# Patient Record
Sex: MALE | Race: WHITE | NOT HISPANIC OR LATINO
[De-identification: names, ages, dates, MRNs, and addresses within clinical notes are randomized per-mention and may not be internally consistent; named-entity substitution may affect disease eponyms.]

---

## 2017-10-31 ENCOUNTER — LABORATORY RESULT (OUTPATIENT)
Age: 62
End: 2017-10-31

## 2017-11-02 ENCOUNTER — LABORATORY RESULT (OUTPATIENT)
Age: 62
End: 2017-11-02

## 2017-11-02 ENCOUNTER — APPOINTMENT (OUTPATIENT)
Dept: INTERNAL MEDICINE | Facility: CLINIC | Age: 62
End: 2017-11-02
Payer: COMMERCIAL

## 2017-11-02 PROCEDURE — 36415 COLL VENOUS BLD VENIPUNCTURE: CPT

## 2017-11-03 LAB
ALBUMIN SERPL ELPH-MCNC: 3.9 G/DL
ALP BLD-CCNC: 40 U/L
ALT SERPL-CCNC: 20 U/L
ANION GAP SERPL CALC-SCNC: 14 MMOL/L
APPEARANCE: CLEAR
AST SERPL-CCNC: 23 U/L
BASOPHILS # BLD AUTO: 0.03 K/UL
BASOPHILS NFR BLD AUTO: 0.6 %
BILIRUB SERPL-MCNC: 0.4 MG/DL
BILIRUBIN URINE: NEGATIVE
BLOOD URINE: ABNORMAL
BUN SERPL-MCNC: 19 MG/DL
CALCIUM SERPL-MCNC: 8.9 MG/DL
CHLORIDE SERPL-SCNC: 100 MMOL/L
CHOLEST SERPL-MCNC: 193 MG/DL
CHOLEST/HDLC SERPL: 3.4 RATIO
CO2 SERPL-SCNC: 26 MMOL/L
COLOR: YELLOW
CREAT SERPL-MCNC: 0.97 MG/DL
EOSINOPHIL # BLD AUTO: 0.28 K/UL
EOSINOPHIL NFR BLD AUTO: 5.3 %
ERYTHROCYTE [SEDIMENTATION RATE] IN BLOOD BY WESTERGREN METHOD: 2 MM/HR
GLUCOSE QUALITATIVE U: NEGATIVE MG/DL
GLUCOSE SERPL-MCNC: 82 MG/DL
HCT VFR BLD CALC: 46.9 %
HDLC SERPL-MCNC: 57 MG/DL
HGB BLD-MCNC: 15.2 G/DL
IMM GRANULOCYTES NFR BLD AUTO: 0 %
KETONES URINE: NEGATIVE
LDLC SERPL CALC-MCNC: 123 MG/DL
LDLC SERPL DIRECT ASSAY-MCNC: 132 MG/DL
LEUKOCYTE ESTERASE URINE: NEGATIVE
LYMPHOCYTES # BLD AUTO: 2.1 K/UL
LYMPHOCYTES NFR BLD AUTO: 39.7 %
MAN DIFF?: NORMAL
MCHC RBC-ENTMCNC: 30.3 PG
MCHC RBC-ENTMCNC: 32.4 GM/DL
MCV RBC AUTO: 93.6 FL
MONOCYTES # BLD AUTO: 0.46 K/UL
MONOCYTES NFR BLD AUTO: 8.7 %
NEUTROPHILS # BLD AUTO: 2.42 K/UL
NEUTROPHILS NFR BLD AUTO: 45.7 %
NITRITE URINE: NEGATIVE
PH URINE: 5.5
PLATELET # BLD AUTO: 244 K/UL
POTASSIUM SERPL-SCNC: 4.4 MMOL/L
PROT SERPL-MCNC: 7.3 G/DL
PROTEIN URINE: NEGATIVE MG/DL
RBC # BLD: 5.01 M/UL
RBC # FLD: 13.2 %
SAVE SPECIMEN: NORMAL
SODIUM SERPL-SCNC: 140 MMOL/L
SPECIFIC GRAVITY URINE: 1.02
T3 SERPL-MCNC: 121 NG/DL
T3RU NFR SERPL: 0.95 INDEX
T4 FREE SERPL-MCNC: 1.3 NG/DL
TRIGL SERPL-MCNC: 66 MG/DL
TSH SERPL-ACNC: 2.6 UIU/ML
UROBILINOGEN URINE: NEGATIVE MG/DL
WBC # FLD AUTO: 5.29 K/UL

## 2017-11-10 ENCOUNTER — NON-APPOINTMENT (OUTPATIENT)
Age: 62
End: 2017-11-10

## 2017-11-10 ENCOUNTER — APPOINTMENT (OUTPATIENT)
Dept: INTERNAL MEDICINE | Facility: CLINIC | Age: 62
End: 2017-11-10
Payer: COMMERCIAL

## 2017-11-10 VITALS
HEIGHT: 71 IN | TEMPERATURE: 97.6 F | RESPIRATION RATE: 16 BRPM | WEIGHT: 160 LBS | HEART RATE: 84 BPM | DIASTOLIC BLOOD PRESSURE: 66 MMHG | SYSTOLIC BLOOD PRESSURE: 118 MMHG | OXYGEN SATURATION: 97 % | BODY MASS INDEX: 22.4 KG/M2

## 2017-11-10 DIAGNOSIS — M54.12 RADICULOPATHY, CERVICAL REGION: ICD-10-CM

## 2017-11-10 PROCEDURE — 93000 ELECTROCARDIOGRAM COMPLETE: CPT

## 2017-11-10 PROCEDURE — 90670 PCV13 VACCINE IM: CPT

## 2017-11-10 PROCEDURE — 99396 PREV VISIT EST AGE 40-64: CPT | Mod: 25

## 2017-11-10 PROCEDURE — 94010 BREATHING CAPACITY TEST: CPT

## 2017-11-10 PROCEDURE — 90472 IMMUNIZATION ADMIN EACH ADD: CPT

## 2017-11-10 PROCEDURE — 90688 IIV4 VACCINE SPLT 0.5 ML IM: CPT

## 2017-11-10 PROCEDURE — G0008: CPT

## 2017-11-10 PROCEDURE — G0009: CPT

## 2019-01-23 ENCOUNTER — APPOINTMENT (OUTPATIENT)
Dept: INTERNAL MEDICINE | Facility: CLINIC | Age: 64
End: 2019-01-23

## 2019-01-25 ENCOUNTER — APPOINTMENT (OUTPATIENT)
Dept: INTERNAL MEDICINE | Facility: CLINIC | Age: 64
End: 2019-01-25
Payer: MEDICARE

## 2019-01-25 ENCOUNTER — LABORATORY RESULT (OUTPATIENT)
Age: 64
End: 2019-01-25

## 2019-01-25 VITALS
WEIGHT: 164 LBS | BODY MASS INDEX: 22.96 KG/M2 | OXYGEN SATURATION: 98 % | SYSTOLIC BLOOD PRESSURE: 120 MMHG | DIASTOLIC BLOOD PRESSURE: 70 MMHG | RESPIRATION RATE: 16 BRPM | TEMPERATURE: 96.7 F | HEART RATE: 74 BPM | HEIGHT: 71 IN

## 2019-01-25 DIAGNOSIS — K63.5 POLYP OF COLON: ICD-10-CM

## 2019-01-25 DIAGNOSIS — R05 COUGH: ICD-10-CM

## 2019-01-25 DIAGNOSIS — H54.7 UNSPECIFIED VISUAL LOSS: ICD-10-CM

## 2019-01-25 DIAGNOSIS — G25.2 OTHER SPECIFIED FORMS OF TREMOR: ICD-10-CM

## 2019-01-25 PROCEDURE — G0009: CPT

## 2019-01-25 PROCEDURE — 99215 OFFICE O/P EST HI 40 MIN: CPT | Mod: 25

## 2019-01-25 PROCEDURE — 36415 COLL VENOUS BLD VENIPUNCTURE: CPT

## 2019-01-25 PROCEDURE — 90674 CCIIV4 VAC NO PRSV 0.5 ML IM: CPT

## 2019-01-25 PROCEDURE — 93000 ELECTROCARDIOGRAM COMPLETE: CPT

## 2019-01-25 PROCEDURE — 90732 PPSV23 VACC 2 YRS+ SUBQ/IM: CPT

## 2019-01-25 PROCEDURE — 94010 BREATHING CAPACITY TEST: CPT

## 2019-01-25 PROCEDURE — G0008: CPT

## 2019-01-25 NOTE — HEALTH RISK ASSESSMENT
[Very Good] : ~his/her~  mood as very good [No falls in past year] : Patient reported no falls in the past year [0] : 2) Feeling down, depressed, or hopeless: Not at all (0) [FreeTextEntry1] : Vision and postherpetic neuralgia, Also tremor of the left hand [] : No [de-identified] : None [[id number]] : 0

## 2019-01-25 NOTE — ASSESSMENT
[FreeTextEntry1] : Neurology consult for management of the intention tremor of the left upper extremity. 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 He also has persisting symptomatology relating to the postherpetic neuralgia of the right forehead.\par \par Ophthalmology consultation for evaluation of diminishing vision.                                                                                    His left inguinal hernia is asymptomatic. He is warned about the risk of incarceration or strangulation and that it is an emergency problem .\par \par Influenza immunization is administered to the left arm and Pneumovax-23 is administered to the right arm.  He is urged to obtain shingrix at the local pharmacy.

## 2019-01-25 NOTE — HISTORY OF PRESENT ILLNESS
[FreeTextEntry1] : Complete annual examination [de-identified] : Feels well.. Still with discomfort from right frontoparietal Zoster..  He has also had diminishing vis in the past He has seen to optometrist in the past year with no improvement in his vision.

## 2019-02-15 LAB
ALBUMIN SERPL ELPH-MCNC: 3.9 G/DL
ALP BLD-CCNC: 53 U/L
ALT SERPL-CCNC: 38 U/L
ANION GAP SERPL CALC-SCNC: 10 MMOL/L
APPEARANCE: CLEAR
AST SERPL-CCNC: 23 U/L
BACTERIA: NEGATIVE
BASOPHILS # BLD AUTO: 0.02 K/UL
BASOPHILS NFR BLD AUTO: 0.3 %
BILIRUB SERPL-MCNC: 0.7 MG/DL
BILIRUBIN URINE: NEGATIVE
BLOOD URINE: ABNORMAL
BUN SERPL-MCNC: 15 MG/DL
CALCIUM SERPL-MCNC: 9.1 MG/DL
CHLORIDE SERPL-SCNC: 100 MMOL/L
CHOLEST SERPL-MCNC: 184 MG/DL
CHOLEST/HDLC SERPL: 3.5 RATIO
CO2 SERPL-SCNC: 28 MMOL/L
COLOR: YELLOW
CREAT SERPL-MCNC: 1.23 MG/DL
EOSINOPHIL # BLD AUTO: 0.17 K/UL
EOSINOPHIL NFR BLD AUTO: 2.8 %
GLUCOSE QUALITATIVE U: NEGATIVE MG/DL
GLUCOSE SERPL-MCNC: 92 MG/DL
HCT VFR BLD CALC: 46.9 %
HDLC SERPL-MCNC: 53 MG/DL
HGB BLD-MCNC: 14.9 G/DL
HYALINE CASTS: 0 /LPF
IMM GRANULOCYTES NFR BLD AUTO: 0.3 %
KETONES URINE: NEGATIVE
LDLC SERPL CALC-MCNC: 114 MG/DL
LEUKOCYTE ESTERASE URINE: NEGATIVE
LYMPHOCYTES # BLD AUTO: 1.53 K/UL
LYMPHOCYTES NFR BLD AUTO: 25.2 %
MAN DIFF?: NORMAL
MCHC RBC-ENTMCNC: 30 PG
MCHC RBC-ENTMCNC: 31.8 GM/DL
MCV RBC AUTO: 94.4 FL
MICROSCOPIC-UA: NORMAL
MONOCYTES # BLD AUTO: 0.7 K/UL
MONOCYTES NFR BLD AUTO: 11.5 %
NEUTROPHILS # BLD AUTO: 3.64 K/UL
NEUTROPHILS NFR BLD AUTO: 59.9 %
NITRITE URINE: NEGATIVE
PH URINE: 7
PLATELET # BLD AUTO: 196 K/UL
POTASSIUM SERPL-SCNC: 4.2 MMOL/L
PROT SERPL-MCNC: 6.6 G/DL
PROTEIN URINE: NEGATIVE MG/DL
PSA FREE FLD-MCNC: 23 %
PSA FREE SERPL-MCNC: 0.68 NG/ML
PSA SERPL-MCNC: 2.92 NG/ML
RBC # BLD: 4.97 M/UL
RBC # FLD: 14 %
RED BLOOD CELLS URINE: 3 /HPF
SODIUM SERPL-SCNC: 138 MMOL/L
SPECIFIC GRAVITY URINE: 1.02
SQUAMOUS EPITHELIAL CELLS: 0 /HPF
T3 SERPL-MCNC: 130 NG/DL
T4 SERPL-MCNC: 7.2 UG/DL
TRIGL SERPL-MCNC: 85 MG/DL
TSH SERPL-ACNC: 2.87 UIU/ML
UROBILINOGEN URINE: NEGATIVE MG/DL
WBC # FLD AUTO: 6.08 K/UL
WHITE BLOOD CELLS URINE: 0 /HPF

## 2019-03-21 ENCOUNTER — APPOINTMENT (OUTPATIENT)
Dept: NEUROLOGY | Facility: CLINIC | Age: 64
End: 2019-03-21

## 2019-03-21 VITALS
BODY MASS INDEX: 23.38 KG/M2 | WEIGHT: 167 LBS | DIASTOLIC BLOOD PRESSURE: 75 MMHG | SYSTOLIC BLOOD PRESSURE: 140 MMHG | HEIGHT: 71 IN | HEART RATE: 82 BPM

## 2019-03-28 ENCOUNTER — APPOINTMENT (OUTPATIENT)
Dept: NEUROLOGY | Facility: CLINIC | Age: 64
End: 2019-03-28
Payer: MEDICARE

## 2019-03-28 VITALS
DIASTOLIC BLOOD PRESSURE: 81 MMHG | HEART RATE: 83 BPM | WEIGHT: 165 LBS | SYSTOLIC BLOOD PRESSURE: 133 MMHG | HEIGHT: 71 IN | BODY MASS INDEX: 23.1 KG/M2

## 2019-03-28 DIAGNOSIS — B02.29 OTHER POSTHERPETIC NERVOUS SYSTEM INVOLVEMENT: ICD-10-CM

## 2019-03-28 PROCEDURE — 99205 OFFICE O/P NEW HI 60 MIN: CPT

## 2019-03-28 NOTE — PHYSICAL EXAM
[General Appearance - Alert] : alert [General Appearance - Well Developed] : well developed [Oriented To Time, Place, And Person] : oriented to person, place, and time [Person] : oriented to person [Place] : oriented to place [Time] : oriented to time [Short Term Intact] : short term memory intact [Span Intact] : the attention span was normal [Naming Objects] : no difficulty naming common objects [Fluency] : fluency intact [Current Events] : adequate knowledge of current events [Cranial Nerves Optic (II)] : visual acuity intact bilaterally,  visual fields full to confrontation, pupils equal round and reactive to light [Cranial Nerves Oculomotor (III)] : extraocular motion intact [Cranial Nerves Trigeminal (V)] : facial sensation intact symmetrically [Cranial Nerves Facial (VII)] : face symmetrical [Cranial Nerves Vestibulocochlear (VIII)] : hearing was intact bilaterally [Cranial Nerves Glossopharyngeal (IX)] : tongue and palate midline [Cranial Nerves Accessory (XI - Cranial And Spinal)] : head turning and shoulder shrug symmetric [Cranial Nerves Hypoglossal (XII)] : there was no tongue deviation with protrusion [Motor Tone] : muscle tone was normal in all four extremities [Motor Strength] : muscle strength was normal in all four extremities [Sensation Tactile Decrease] : light touch was intact [Sensation Pain / Temperature Decrease] : pain and temperature was intact [Coordination - Dysmetria Impaired Finger-to-Nose Bilateral] : not present [1+] : Patella left 1+ [FreeTextEntry5] : left hand postural tremor and action tremors. [Extraocular Movements] : extraocular movements were intact [Hearing Threshold Finger Rub Not [place]] : hearing was normal [Full Pulse] : the pedal pulses are present [Abnormal Walk] : normal gait [] : no rash

## 2019-03-28 NOTE — DISCUSSION/SUMMARY
[FreeTextEntry1] : 63 M who is here for initial consultation of essential tremor that is likely related to his occupation. Will try propranolol 20mg for his symptoms prn.\par \par For his neuralgia, will try lidocaine cream as patient prefers that over medication. \par \par Patient was advised to continue to monitor for neurologic symptoms and to notify my office or go to the nearest emergency room if there are any changes. Neurologic issues were discussed with the patient. All questions were answered to complete satisfaction. Education was provided to the patient during this encounter.\par Side effects of the above medications were discussed in detail including but not limited to applicable black box warning and teratogenicity as appropriate. \par Patient was advised to bring previous records to my office. \par \par

## 2019-03-28 NOTE — HISTORY OF PRESENT ILLNESS
[FreeTextEntry1] : 63 M who had herpes zoster 2 years ago in the right V1 distribution. He had no residual deficits but occasionally, he has irritation in the V1 distribution. It's very annoying at times. He has spoken with a compounding pharmacist as he is not keen on taking medications for the symptom.\par \par He is a  and has noticed left hand tremors when he presses his fingers together when he's working. His father also had tremor when he was older. He is not particularly affected by this but he does want something on hand in case it worsens.

## 2019-03-28 NOTE — CONSULT LETTER
[Dear  ___] : Dear  [unfilled], [FreeTextEntry1] : \par \par Thank you very much for allowing me to participate in the care of your patient. Please don't hesitate to contact me with any questions or concerns. \par \par Sincerely,\par Jeffrey Stern MD\par Neurology\par [facility]\par [address]\[place] [address]\par Tel: [phone]\par Email: [email or web address]\par \par

## 2020-01-02 ENCOUNTER — APPOINTMENT (OUTPATIENT)
Dept: ORTHOPEDIC SURGERY | Facility: CLINIC | Age: 65
End: 2020-01-02
Payer: MEDICARE

## 2020-01-02 VITALS
SYSTOLIC BLOOD PRESSURE: 126 MMHG | BODY MASS INDEX: 23.1 KG/M2 | HEIGHT: 71 IN | HEART RATE: 112 BPM | WEIGHT: 165 LBS | DIASTOLIC BLOOD PRESSURE: 83 MMHG

## 2020-01-02 DIAGNOSIS — Z78.9 OTHER SPECIFIED HEALTH STATUS: ICD-10-CM

## 2020-01-02 DIAGNOSIS — Z80.9 FAMILY HISTORY OF MALIGNANT NEOPLASM, UNSPECIFIED: ICD-10-CM

## 2020-01-02 DIAGNOSIS — M75.41 IMPINGEMENT SYNDROME OF RIGHT SHOULDER: ICD-10-CM

## 2020-01-02 PROCEDURE — 99203 OFFICE O/P NEW LOW 30 MIN: CPT | Mod: 25

## 2020-01-02 PROCEDURE — 73030 X-RAY EXAM OF SHOULDER: CPT | Mod: RT

## 2020-01-02 NOTE — PHYSICAL EXAM
[de-identified] : Oriented to time, place, person\par Mood: Normal\par Affect: Normal\par Appearance: Healthy, well appearing, no acute distress.\par Gait: Normal\par Assistive Devices: None \par \par Right shoulder exam\par \par Inspection: No malalignment, No defects, No atrophy\par Skin: No masses, No lesions\par Neck: Negative Spurling's, full ROM, no pain with ROM\par AROM: FF to 180, abduction to 90, ER to 50, IR to mid lumbar \par Painful arc ROM: pain with abduction\par Tenderness: no bicipital tenderness, no tenderness to the greater tuberosity/RTC insertion, no anterior shoulder/lesser tuberosity tenderness\par Strength: 4.5/5 ER, 5/5 IR in adduction, 5/5 supraspinatus testing, + Yoakum's test\par AC Joint: No ttp/pain with cross arm testing\par Biceps: Speed Negative, Yergusons Negative\par Impingement test: + Mota, + Neer \par Stability: Stable\par Vasc: 2+ radial pulse\par Neuro: AIN, PIN, Ulnar nerve intact to motor\par Sensation: Intact to light touch throughout  [de-identified] : The following radiographs were ordered and read by me during this patients visit. I reviewed each radiograph in detail with the patient and discussed the findings as highlighted below.\par \par 3 views of the right shoulder were obtained today, 01/02/2020, that show no acute fracture or dislocation. There is no glenohumeral and mild AC joint degenerative changes seen. Type II acromion. There is no significant malalignment. No significant other obvious osseous abnormality, otherwise unremarkable.

## 2020-01-02 NOTE — DISCUSSION/SUMMARY
[de-identified] : 64 year old male presents with acute on chronic right shoulder pain. 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 Fortunately he has been improving with home exercises.  A discussion was had with the patient regarding potential sources of inflammatory shoulder discomfort; including rotator cuff tendon dysfunction (including tendonitis vs. internal structural damage), subdeltoid/subacromial bursitis, or impingement of the rotator cuff at the acromion. Other contributing sources of pain may be the acromioclavicular joint or long head of the biceps tendon. 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 Nonsurgical treatment is typically first-line therapy that may take weeks to months to resolve symptoms; includes rest from overhead activities, NSAIDs, home exercise program versus physical therapy to restore normal strength/ROM/function of the shoulder, and possible corticosteroid injection. Also discussed the role of arthroscopic surgical intervention when nonsurgical treatment does not adequately relieve pain/inflammation.\par  \par Recommendations: Conservative modalities as above (overhead activity rest/activity avoidance until less symptomatic, ice, NSAIDs, home exercise strengthening and stretching program). HEP was provided. \par  \par Followup: If symptoms progress or persist for additional treatment as needed.

## 2020-01-02 NOTE — HISTORY OF PRESENT ILLNESS
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 He recalls 6-7 years of pain which got worse after he missed a step coming down a ladder and his arm went in to a recycling bin continuing empty bottles on 12/21/19. The pain and motion has improved with home exercises he found online. He continues to have some residual  pain with certain movements. Denies numbness or tingling. Ibuprofen is helpful. \par \par The patient's past medical history, past surgical history, medications and allergies were reviewed by me today with the patient and documented accordingly. In addition, the patient's family and social history, which were noncontributory to this visit, were reviewed also.

## 2020-01-02 NOTE — ADDENDUM
[FreeTextEntry1] : This note was written by Dinah Hester on 01/02/2020 acting solely as a scribe for Dr. Shivam Benavides.\par \par All medical record entries made by the Scribe were at my, Dr. Shivam Benavides, direction and personally dictated by me on 01/02/2020. I have personally reviewed the chart and agree that the record accurately reflects my personal performance of the history, physical exam, assessment and plan."

## 2020-01-23 ENCOUNTER — APPOINTMENT (OUTPATIENT)
Dept: INTERNAL MEDICINE | Facility: CLINIC | Age: 65
End: 2020-01-23

## 2020-01-30 ENCOUNTER — APPOINTMENT (OUTPATIENT)
Dept: INTERNAL MEDICINE | Facility: CLINIC | Age: 65
End: 2020-01-30

## 2020-07-24 ENCOUNTER — APPOINTMENT (OUTPATIENT)
Dept: INTERNAL MEDICINE | Facility: CLINIC | Age: 65
End: 2020-07-24
Payer: MEDICARE

## 2020-07-24 ENCOUNTER — LABORATORY RESULT (OUTPATIENT)
Age: 65
End: 2020-07-24

## 2020-07-24 VITALS
HEART RATE: 88 BPM | SYSTOLIC BLOOD PRESSURE: 122 MMHG | TEMPERATURE: 98.2 F | RESPIRATION RATE: 16 BRPM | DIASTOLIC BLOOD PRESSURE: 76 MMHG | WEIGHT: 166 LBS | OXYGEN SATURATION: 98 % | HEIGHT: 71 IN | BODY MASS INDEX: 23.24 KG/M2

## 2020-07-24 DIAGNOSIS — Z80.42 FAMILY HISTORY OF MALIGNANT NEOPLASM OF PROSTATE: ICD-10-CM

## 2020-07-24 DIAGNOSIS — K40.90 UNILATERAL INGUINAL HERNIA, W/OUT OBSTRUCTION OR GANGRENE, NOT SPECIFIED AS RECURRENT: ICD-10-CM

## 2020-07-24 DIAGNOSIS — Z13.220 ENCOUNTER FOR SCREENING FOR LIPOID DISORDERS: ICD-10-CM

## 2020-07-24 DIAGNOSIS — Z12.5 ENCOUNTER FOR SCREENING FOR MALIGNANT NEOPLASM OF PROSTATE: ICD-10-CM

## 2020-07-24 DIAGNOSIS — G25.0 ESSENTIAL TREMOR: ICD-10-CM

## 2020-07-24 DIAGNOSIS — Z00.00 ENCOUNTER FOR GENERAL ADULT MEDICAL EXAMINATION W/OUT ABNORMAL FINDINGS: ICD-10-CM

## 2020-07-24 DIAGNOSIS — R00.2 PALPITATIONS: ICD-10-CM

## 2020-07-24 DIAGNOSIS — Z11.59 ENCOUNTER FOR SCREENING FOR OTHER VIRAL DISEASES: ICD-10-CM

## 2020-07-24 DIAGNOSIS — R53.82 CHRONIC FATIGUE, UNSPECIFIED: ICD-10-CM

## 2020-07-24 PROCEDURE — 99215 OFFICE O/P EST HI 40 MIN: CPT | Mod: 25

## 2020-07-24 PROCEDURE — G0438: CPT

## 2020-07-24 PROCEDURE — 93000 ELECTROCARDIOGRAM COMPLETE: CPT

## 2020-07-24 PROCEDURE — 71046 X-RAY EXAM CHEST 2 VIEWS: CPT

## 2020-07-24 PROCEDURE — 36415 COLL VENOUS BLD VENIPUNCTURE: CPT

## 2020-07-24 RX ORDER — LIDOCAINE 4% 4 G/100G
4 CREAM TOPICAL
Qty: 1 | Refills: 0 | Status: DISCONTINUED | COMMUNITY
Start: 2019-03-28 | End: 2020-07-24

## 2020-07-24 RX ORDER — PROPRANOLOL HYDROCHLORIDE 20 MG/1
20 TABLET ORAL DAILY
Qty: 60 | Refills: 0 | Status: DISCONTINUED | COMMUNITY
Start: 2019-03-28 | End: 2020-07-24

## 2020-07-24 NOTE — REVIEW OF SYSTEMS
Verified Results  COMP METABOLIC PANEL WITH LIPID PANEL (CPNL,LIPDPL) 20Jan2018 11:38AM ANA MARIA TAYLOR   [Jan 22, 2018 7:49AM ANA MARIA TAYLOR]  All lab results are within acceptable limits.     Test Name Result Flag Reference   SODIUM 141 mmol/L  135-145   POTASSIUM 4.2 mmol/L  3.4-5.1   CHLORIDE 107 mmol/L     CARBON DIOXIDE 27 mmol/L  21-32   ANION GAP 11 mmol/L  10-20   GLUCOSE 81 mg/dl  65-99   BUN 8 mg/dl  6-20   CREATININE 0.71 mg/dl  0.51-0.95   GFR EST.AFRICAN AMER >90     eGFR results = or >90 mL/min/1.73m2 = Normal kidney function.   GFR EST.NONAFRI AMER >90     eGFR results = or >90 mL/min/1.73m2 = Normal kidney function.   BUN/CREATININE RATIO 11  7-25   BILIRUBIN TOTAL 0.3 mg/dl  0.2-1.0   GOT/AST 15 Units/L  <38   ALKALINE PHOSPHATASE 87 Units/L     ALBUMIN 3.8 g/dl  3.6-5.1   TOTAL PROTEIN 7.8 g/dl  6.4-8.2   GLOBULIN (CALCULATED) 4.0 g/dl  2.0-4.0   A/G RATIO 1.0  1.0-2.4   CALCIUM 9.4 mg/dl  8.4-10.2   GPT/ALT 30 Units/L  <79   FASTING STATUS 12 hrs     CHOLESTEROL 186 mg/dl  <200   Desirable            <200  Borderline High      200 to 239  High                 >=240   HDL CHOLESTEROL 49 mg/dl L >49   Low            <40  Borderline Low 40 to 49  Near Optimal   50 to 59  Optimal        >=60   TRIGLYCERIDES 81 mg/dl  <150   Normal                   <150  Borderline High          150 to 199  High                     200 to 499  Very High                >=500   LDL CHOLESTEROL (CALCULATED) 121 mg/dl  <130   OPTIMAL               <100  NEAR OPTIMAL          100-129  BORDERLINE HIGH       130-159  HIGH                  160-189  VERY HIGH             >=190   NON-HDL CHOLESTEROL 137 mg/dl     Therapeutic Target:  CHD and risk equivalents <130  Multiple risk factors    <160  0 to 1 risk factors      <190   CHOLESTEROL/HDL RATIO 3.8  <4.5   FASTING STATUS 12 hrs          [Vision Problems] : vision problems [Negative] : Heme/Lymph [FreeTextEntry3] : Not seeing as well.He is seen by an ophthalmologist annually [FreeTextEntry4] : Coarse voice all his life., ENT exam revealed small polyp, operated. No improvement

## 2020-07-24 NOTE — HEALTH RISK ASSESSMENT
[Excellent] : ~his/her~  mood as  excellent [30 or more] : 30 or more [Yes] : Yes [No] : In the past 12 months have you used drugs other than those required for medical reasons? No [No falls in past year] : Patient reported no falls in the past year [0] : 2) Feeling down, depressed, or hopeless: Not at all (0) [With Family] : lives with family [None] : None [Feels Safe at Home] : Feels safe at home [] :  [Employed] : employed [Fully functional (using the telephone, shopping, preparing meals, housekeeping, doing laundry, using] : Fully functional and needs no help or supervision to perform IADLs (using the telephone, shopping, preparing meals, housekeeping, doing laundry, using transportation, managing medications and managing finances) [Fully functional (bathing, dressing, toileting, transferring, walking, feeding)] : Fully functional (bathing, dressing, toileting, transferring, walking, feeding) [Reports normal functional visual acuity (ie: able to read med bottle)] : Reports normal functional visual acuity [Smoke Detector] : smoke detector [Carbon Monoxide Detector] : carbon monoxide detector [Sunscreen] : uses sunscreen [Seat Belt] :  uses seat belt [Patient/Caregiver not ready to engage] : Patient/Caregiver not ready to engage [Patient reported colonoscopy was normal] : Patient reported colonoscopy was normal [Hepatitis C test declined] : Hepatitis C test declined [HIV test declined] : HIV test declined [# Of Children ___] : has [unfilled] children [Sexually Active] : sexually active [FreeTextEntry1] : Gen. health [] : No [de-identified] : None [de-identified] : Reasonably active [Audit-CScore] : 1 [[id number]] : 0 [de-identified] : Healthy [Change in mental status noted] : No change in mental status noted [Language] : denies difficulty with language [Learning/Retaining New Information] : denies difficulty learning/retaining new information [Handling Complex Tasks] : denies difficulty handling complex tasks [Spatial Ability and Orientation] : denies difficulty with spatial ability and orientation [Reports changes in hearing] : Reports no changes in hearing [High Risk Behavior] : no high risk behavior [Reports changes in dental health] : Reports no changes in dental health [Reports changes in vision] : Reports no changes in vision [Travel to Developing Areas] : does not  travel to developing areas [Caregiver Concerns] : does not have caregiver concerns [TB Exposure] : is not being exposed to tuberculosis [FreeTextEntry2] : [place] [ColonoscopyComments] : Needs colonoscopy q.3 years-mother had colon cancer [ColonoscopyDate] : 11/15 [AdvancecareDate] : 7/20

## 2020-07-24 NOTE — COUNSELING
[Encouraged to pick a quit date and identify support needed to quit] : Encouraged to pick a quit date and identify support needed to quit [Willing to Quit Smoking] : Not willing to quit smoking

## 2020-07-24 NOTE — ASSESSMENT
[FreeTextEntry1] : Normal health. Lab results pending.\par \par Colonoscopy will be scheduled.He is overdue.\par \par CPE in one year

## 2020-07-24 NOTE — DATA REVIEWED
[FreeTextEntry1] : Electrocardiogram reveals a sinus rhythm with a short WI syndrome, otherwise within normal limits.\par \par Chest x-ray reveals clear lung fields, normal heart size and a normal bony thorax for age-no active disease.\par \par Blood work results are pending, urinalysis and antibodies also pending

## 2020-07-26 DIAGNOSIS — R97.20 ELEVATED PROSTATE, SPECIFIC ANTIGEN [PSA]: ICD-10-CM

## 2020-07-26 LAB
ALBUMIN SERPL ELPH-MCNC: 4.3 G/DL
ALP BLD-CCNC: 47 U/L
ALT SERPL-CCNC: 26 U/L
ANION GAP SERPL CALC-SCNC: 11 MMOL/L
APPEARANCE: CLEAR
AST SERPL-CCNC: 22 U/L
BASOPHILS # BLD AUTO: 0.04 K/UL
BASOPHILS NFR BLD AUTO: 0.8 %
BILIRUB SERPL-MCNC: 0.4 MG/DL
BILIRUBIN URINE: NEGATIVE
BLOOD URINE: NORMAL
BUN SERPL-MCNC: 19 MG/DL
CALCIUM SERPL-MCNC: 9 MG/DL
CHLORIDE SERPL-SCNC: 103 MMOL/L
CHOLEST SERPL-MCNC: 199 MG/DL
CHOLEST/HDLC SERPL: 3.9 RATIO
CO2 SERPL-SCNC: 28 MMOL/L
COLOR: YELLOW
CREAT SERPL-MCNC: 1.09 MG/DL
EOSINOPHIL # BLD AUTO: 0.3 K/UL
EOSINOPHIL NFR BLD AUTO: 5.9 %
GLUCOSE QUALITATIVE U: NEGATIVE
GLUCOSE SERPL-MCNC: 95 MG/DL
HCT VFR BLD CALC: 47.5 %
HDLC SERPL-MCNC: 51 MG/DL
HGB BLD-MCNC: 14.9 G/DL
IMM GRANULOCYTES NFR BLD AUTO: 0.4 %
KETONES URINE: NEGATIVE
LDLC SERPL CALC-MCNC: 132 MG/DL
LEUKOCYTE ESTERASE URINE: NEGATIVE
LYMPHOCYTES # BLD AUTO: 2.03 K/UL
LYMPHOCYTES NFR BLD AUTO: 40 %
MAN DIFF?: NORMAL
MCHC RBC-ENTMCNC: 29.7 PG
MCHC RBC-ENTMCNC: 31.4 GM/DL
MCV RBC AUTO: 94.6 FL
MONOCYTES # BLD AUTO: 0.46 K/UL
MONOCYTES NFR BLD AUTO: 9.1 %
NEUTROPHILS # BLD AUTO: 2.22 K/UL
NEUTROPHILS NFR BLD AUTO: 43.8 %
NITRITE URINE: NEGATIVE
PH URINE: 6.5
PLATELET # BLD AUTO: 205 K/UL
POTASSIUM SERPL-SCNC: 4.4 MMOL/L
PROT SERPL-MCNC: 6.7 G/DL
PROTEIN URINE: NORMAL
PSA SERPL-MCNC: 4.23 NG/ML
RBC # BLD: 5.02 M/UL
RBC # FLD: 13.2 %
SARS-COV-2 IGG SERPL IA-ACNC: 0.1 INDEX
SARS-COV-2 IGG SERPL QL IA: NEGATIVE
SODIUM SERPL-SCNC: 141 MMOL/L
SPECIFIC GRAVITY URINE: 1.03
T3RU NFR SERPL: 1 TBI
T4 SERPL-MCNC: 6.3 UG/DL
TRIGL SERPL-MCNC: 84 MG/DL
TSH SERPL-ACNC: 2.55 UIU/ML
UROBILINOGEN URINE: NORMAL
WBC # FLD AUTO: 5.07 K/UL

## 2020-07-28 DIAGNOSIS — Z01.818 ENCOUNTER FOR OTHER PREPROCEDURAL EXAMINATION: ICD-10-CM

## 2020-07-31 ENCOUNTER — APPOINTMENT (OUTPATIENT)
Dept: DISASTER EMERGENCY | Facility: CLINIC | Age: 65
End: 2020-07-31

## 2020-08-01 LAB — SARS-COV-2 N GENE NPH QL NAA+PROBE: NOT DETECTED

## 2020-08-03 ENCOUNTER — APPOINTMENT (OUTPATIENT)
Dept: GASTROENTEROLOGY | Facility: CLINIC | Age: 65
End: 2020-08-03
Payer: MEDICARE

## 2020-08-03 PROCEDURE — 45378 DIAGNOSTIC COLONOSCOPY: CPT

## 2021-01-28 ENCOUNTER — APPOINTMENT (OUTPATIENT)
Dept: INTERNAL MEDICINE | Facility: CLINIC | Age: 66
End: 2021-01-28
Payer: MEDICARE

## 2021-01-28 PROCEDURE — 36415 COLL VENOUS BLD VENIPUNCTURE: CPT

## 2021-01-29 LAB — PSA SERPL-MCNC: 5.17 NG/ML

## 2021-11-02 ENCOUNTER — TRANSCRIPTION ENCOUNTER (OUTPATIENT)
Age: 66
End: 2021-11-02

## 2022-04-08 ENCOUNTER — APPOINTMENT (OUTPATIENT)
Dept: INTERNAL MEDICINE | Facility: CLINIC | Age: 67
End: 2022-04-08